# Patient Record
Sex: MALE | Race: WHITE
[De-identification: names, ages, dates, MRNs, and addresses within clinical notes are randomized per-mention and may not be internally consistent; named-entity substitution may affect disease eponyms.]

---

## 2018-04-11 ENCOUNTER — HOSPITAL ENCOUNTER (OUTPATIENT)
Dept: HOSPITAL 76 - DI | Age: 68
Discharge: HOME | End: 2018-04-11
Attending: INTERNAL MEDICINE
Payer: OTHER GOVERNMENT

## 2018-04-11 DIAGNOSIS — M19.071: Primary | ICD-10-CM

## 2018-04-11 NOTE — XRAY REPORT
THREE VIEW RIGHT ANKLE:  04/11/2018

 

CLINICAL INDICATION:  Pain.

 

FINDINGS:  AP, lateral, oblique views of the right ankle demonstrate mild osteoarthritis of the

ankle mortise.  There is no evidence of acute fracture or dislocation.  No effusion is present.

 Minimal plantar calcaneal spurring is noted.

 

IMPRESSION:  MILD OSTEOARTHRITIS.

 

 

DD: 04/11/2018 13:15

TD: 04/11/2018 13:27

Job #: [id number]

## 2018-10-25 ENCOUNTER — HOSPITAL ENCOUNTER (OUTPATIENT)
Dept: HOSPITAL 76 - DI | Age: 68
Discharge: HOME | End: 2018-10-25
Attending: INTERNAL MEDICINE
Payer: MEDICARE

## 2018-10-25 DIAGNOSIS — M25.78: ICD-10-CM

## 2018-10-25 DIAGNOSIS — M51.36: Primary | ICD-10-CM

## 2018-10-25 DIAGNOSIS — M51.26: ICD-10-CM

## 2018-10-25 DIAGNOSIS — M41.86: ICD-10-CM

## 2018-10-25 DIAGNOSIS — M79.604: ICD-10-CM

## 2018-10-25 PROCEDURE — 72148 MRI LUMBAR SPINE W/O DYE: CPT

## 2021-03-31 ENCOUNTER — HOSPITAL ENCOUNTER (OUTPATIENT)
Dept: HOSPITAL 76 - DI | Age: 71
Discharge: HOME | End: 2021-03-31
Attending: INTERNAL MEDICINE
Payer: COMMERCIAL

## 2021-03-31 DIAGNOSIS — E04.2: Primary | ICD-10-CM

## 2021-03-31 NOTE — ULTRASOUND REPORT
PROCEDURE:  Head or Neck Soft Tissue

 

INDICATIONS:  FH THYROID CA

 

TECHNIQUE:  

Real-time scanning was performed of the thyroid gland, with image documentation.  

 

COMPARISON:  None

 

FINDINGS:  

Right:  Thyroid lobe measures 3.7 x 2.3 x 2.1 cm, and is homogeneous in echotexture.  

Left:  Thyroid lobe measures 3.2 x 1.6 x 1.3 cm, and is homogenous in echotexture.  

Isthmus:  8  mm thick.  

 

Nodule number:  One

Location:  Right mid

Size:  1.0 x 0.8 x 0.7   cm.  

Composition:  Predominantly cystic  

Echogenicity:  Hypoechoic   

Shape:  wider than tall.

Margins:  Lobulated

Echogenic foci:  None

Total points:  2

ACR TI-RADS category:  2

 

Nodule number:  Two

Location:  Right middle lobe

Size:  0.6 x 0.6 x 0.6 cm.  

Composition:  Prominently cystic

Echogenicity:  Hypoechoic 

Shape:  wider than tall.

Margins:  Lobular

Echogenic foci:  None 

Total points:  2 

ACR TI-RADS category:  2

 

 

IMPRESSION:  

 

1. Two thyroid foci corresponding to category 2 lesions. There appear most suggestive of cysts and as
 noted no additional follow-up is recommended.

 

ACR TI-RADS definitions and recommendations:  

TI-RADS 1 (benign): 0 points.  FNA not needed. 

TI-RADS 2 (not suspicious): 2 points.  FNA not needed. 

TI-RADS 3 (mildly suspicious): 3 points. 

?  FNA if 2.5 cm or larger, follow up if 1.5 cm or larger (at 1, 3, and 5 years). 

TI-RADS 4 (moderately suspicious): 4-6 points.  

?  FNA if 1.5 cm or larger, follow up if 1 cm or larger (at 1, 2, 3, and 5 years).  

TI-RADS 5 (highly suspicious): 7 points or more.  

?  FNA if 1 cm or larger, follow up if 0.5 cm or larger (every year for 5 years).  

 

Reviewed by: Anca Lynne MD on 3/31/2021 4:35 PM PDT

Approved by: Anca Lynne MD on 3/31/2021 4:35 PM PDT

 

 

Station ID:  [id number]

## 2021-04-02 ENCOUNTER — HOSPITAL ENCOUNTER (OUTPATIENT)
Dept: HOSPITAL 76 - DI.N | Age: 71
Discharge: HOME | End: 2021-04-02
Attending: PHYSICIAN ASSISTANT
Payer: MEDICARE

## 2021-04-02 DIAGNOSIS — Z96.642: ICD-10-CM

## 2021-04-02 DIAGNOSIS — M25.552: Primary | ICD-10-CM

## 2021-04-02 NOTE — XRAY REPORT
PROCEDURE:  Hip w/Pelvis 1V LT

 

INDICATIONS:  LEFT HIP PAIN

 

TECHNIQUE:  AP pelvis with lateral view(s) of the left hip(s).  

 

COMPARISON:  None.

 

FINDINGS:  

 

Bones:  No fractures or dislocations.  Pelvic ring appears intact.  No suspicious bony lesions.  Left
 hip arthroplasty. Hardware appears intact.

 

Soft tissues:  The visualized bowel gas pattern is normal.  No suspicious soft tissue calcifications.
  

 

IMPRESSION:  Expected appearance of left hip arthroplasty. No acute fracture. No osseous lesion. If s
ymptoms and/or clinical suspicion for pathology continue, further assessment with repeat plain films,
 or advanced imaging (e.g., CT or bone scan) is recommended for further assessment.

 

Reviewed by: Jessica Ha MD on 4/2/2021 2:17 PM PDT

Approved by: Jessica Ha MD on 4/2/2021 2:17 PM PDT

 

 

Station ID:  [id number]

## 2023-01-13 ENCOUNTER — HOSPITAL ENCOUNTER (OUTPATIENT)
Dept: HOSPITAL 76 - DI | Age: 73
Discharge: HOME | End: 2023-01-13
Attending: INTERNAL MEDICINE
Payer: COMMERCIAL

## 2023-01-13 DIAGNOSIS — R22.42: Primary | ICD-10-CM

## 2023-01-13 NOTE — ULTRASOUND REPORT
PROCEDURE:  Ext Limited Non Vascular

 

INDICATIONS:  MASS OF LOWER LIMB

 

TECHNIQUE:  Real-time scanning was performed of the left hip, with image documentation.  

 

COMPARISON:  Left hip radiographs 4/2/2021.

 

FINDINGS:  

Anechoic fluid collection is seen at the area of interest adjacent to the left hip measuring 6.4 x 2.
4 x 3.3 cm may communicate with the joint space.

 

IMPRESSION:  

Irregular 6.4 x 2.4 x 3.3 cm anechoic fluid collection corresponds to the palpable area of concern ad
jacent to the left hip. Differential considerations include a postsurgical collection such as seroma 
or chronic hematoma versus infection or particle disease. Recommend clinical correlation. MRI could b
e performed for further evaluation if indicated clinically.

 

Reviewed by: Dimitri Cesar MD on 1/13/2023 3:41 PM PST

Approved by: Dimitri Cesar MD on 1/13/2023 3:41 PM PST

 

 

Station ID:  [id number]

## 2023-04-04 ENCOUNTER — HOSPITAL ENCOUNTER (OUTPATIENT)
Dept: HOSPITAL 76 - DI | Age: 73
Discharge: HOME | End: 2023-04-04
Attending: ORTHOPAEDIC SURGERY
Payer: COMMERCIAL

## 2023-04-04 DIAGNOSIS — M16.11: ICD-10-CM

## 2023-04-04 DIAGNOSIS — Z47.1: Primary | ICD-10-CM

## 2023-04-04 DIAGNOSIS — Z96.642: ICD-10-CM

## 2023-04-04 NOTE — XRAY REPORT
PROCEDURE:  Hip w/Pelvis 2-3V LT

 

INDICATIONS:  HX OF LEFT TOTAL HIP ARTHROPLASTY

 

TECHNIQUE:  AP pelvis with lateral view(s) of the left hip(s).  

 

COMPARISON:  April 2, 2021

 

FINDINGS:  

 

Bones: Left hip arthroplasty in similar compared to prior. Mild right hip arthrosis. Partially visual
ized lumbosacral degenerative changes.

 

Soft tissues: There is a small amount heterotopic ossification, also seen previously. Mesh repair.

 

IMPRESSION:

No acute radiographic abnormality.  If further imaging is needed, consider CT or nuclear medicine bon
e scan.

 

Reviewed by: Dominic Wilder MD on 4/4/2023 1:45 PM PDT

Approved by: Dominic Wilder MD on 4/4/2023 1:45 PM PDT

 

 

Station ID:  [id number]

## 2023-04-10 ENCOUNTER — HOSPITAL ENCOUNTER (OUTPATIENT)
Dept: HOSPITAL 76 - LAB.S | Age: 73
Discharge: HOME | End: 2023-04-10
Attending: ORTHOPAEDIC SURGERY
Payer: COMMERCIAL

## 2023-04-10 DIAGNOSIS — T84.091A: Primary | ICD-10-CM

## 2023-04-10 PROCEDURE — 36415 COLL VENOUS BLD VENIPUNCTURE: CPT

## 2023-04-10 PROCEDURE — 86140 C-REACTIVE PROTEIN: CPT

## 2023-04-10 PROCEDURE — 85651 RBC SED RATE NONAUTOMATED: CPT

## 2023-04-15 ENCOUNTER — HOSPITAL ENCOUNTER (OUTPATIENT)
Dept: HOSPITAL 76 - DI | Age: 73
Discharge: HOME | End: 2023-04-15
Attending: ORTHOPAEDIC SURGERY
Payer: COMMERCIAL

## 2023-04-15 DIAGNOSIS — T84.091A: Primary | ICD-10-CM

## 2023-04-15 DIAGNOSIS — S76.022A: ICD-10-CM

## 2023-04-15 DIAGNOSIS — M25.452: ICD-10-CM

## 2023-04-15 DIAGNOSIS — R93.6: ICD-10-CM

## 2023-04-15 DIAGNOSIS — R93.89: ICD-10-CM

## 2023-04-17 NOTE — MRI REPORT
PROCEDURE:  HIP WO - LT

 

INDICATIONS:  MECHANICAL COMPLICATION OF LEFT HIP PROSTHESIS

 

TECHNIQUE:  

Noncontrast coronal T1 spin echo and STIR through the bony pelvis.  Coronal and axial T2 fast spin ec
ho with fat saturation, sagittal T1 spin echo, and oblique axial T2 fast spin echo with fat saturatio
n through the hip.  

 

COMPARISON:  Left hip radiograph dated 4/4/2023, 4/2/2021 and ultrasound of left hip dated 1/13/2023.


 

FINDINGS:  

Image quality: Diagnostic. Susceptibility artifacts from left hip prosthesis is seen. 

 

Bones and joints: Patient is status post left total hip arthroplasty. Susceptibility artifacts are no
traci in left hip slightly limits evaluation. No gross marrow edema is seen. No acute fracture or dislo
cation. No suspicious bony lesion. Moderate right hip joint osteoarthritic changes are noted. No evid
ence of avascular necrosis of femoral head. The visualized lower lumbar spine is normally aligned.

 

Soft tissues: There is low to moderate grade tendinosis and partial thickness tear involving distal l
eft gluteus medius and minimus tendons near their insertions on greater trochanter. Lobulated and com
plex appearing intramuscular fluid deep to or possibly involving left quadratus femoris muscle at the
 level of left femoral neck and greater trochanter is seen and measures up to 6.1 x 2.2 x 5.9 cm in s
ize and appears to be communicating with left hip joint space. This likely corresponds to the ultraso
und finding of complex fluid collection in this area. There is suggestion of a second collection invo
lving more inferior portion of the quadratus femoris muscle anterior to proximal hamstring muscles an
d measures up to 5.4 x 3.1 x 1.9 cm in size.   Ill-defined lobulated subcutaneous soft tissue collect
ion in lateral upper thigh at the level of femoral head is also seen and measures up to 2 x 4.2 x 3.3
 cm in size.

 

The proximal sciatic neurovascular bundle appears normal adjacent to the hamstring tendons.  No free 
pelvic fluid.  Bladder wall thickness is normal.  Genitourinary structures and bowel loops appear nor
mal where visualized.  

 

IMPRESSION:

1. Prior left total hip arthroplasty with anatomic left hip alignment. No gross marrow edema. No acut
e periprosthetic fracture or dislocation.

2. Small to moderate amount of left hip joint effusion communicating with a 6.1 x 2.2 x 5.9 cm lobula
traci and septated fluid collection deep to the left quadratus femoris muscle at the level of left femo
ral neck. Underlying left quadratus femoris muscle involvement cannot be excluded. Finding may repres
ent trochanteric bursitis versus seroma. Overall appearance is not suggestive of abscess collection.

3. 5.4 x 3.1 x 1.9 cm lobulated fluid collection within more distal left quadratus femoris muscle at 
the level of proximal left femoral shaft and may represent organizing intramuscular hematoma or serom
a.

4. Ill-defined subcutaneous soft tissue edema and fluid in lateral upper thigh at the level of femora
l head and measures 2 x 4.2 x 3.3 cm in size and may represent subcutaneous soft tissue seroma or org
anizing trauma. Clinical correlation is recommended for evaluation of possible infection in these col
lections.

5. Distal left gluteus medius and minimus tendinosis and low to moderate grade partial-thickness tear
 at their insertion on greater trochanter.

 

Reviewed by: Jatin Mitchell MD on 4/17/2023 9:47 AM PDT

Approved by: Jatin Mitchell MD on 4/17/2023 9:47 AM PDT

 

 

Station ID:  [id number]

## 2023-10-19 ENCOUNTER — HOSPITAL ENCOUNTER (OUTPATIENT)
Dept: HOSPITAL 76 - DI | Age: 73
Discharge: HOME | End: 2023-10-19
Attending: INTERNAL MEDICINE
Payer: MEDICARE

## 2023-10-19 DIAGNOSIS — R01.1: Primary | ICD-10-CM

## 2023-10-19 PROCEDURE — 93306 TTE W/DOPPLER COMPLETE: CPT
